# Patient Record
Sex: MALE | Race: ASIAN | NOT HISPANIC OR LATINO
[De-identification: names, ages, dates, MRNs, and addresses within clinical notes are randomized per-mention and may not be internally consistent; named-entity substitution may affect disease eponyms.]

---

## 2021-05-24 PROBLEM — Z00.00 ENCOUNTER FOR PREVENTIVE HEALTH EXAMINATION: Status: ACTIVE | Noted: 2021-05-24

## 2021-07-12 ENCOUNTER — APPOINTMENT (OUTPATIENT)
Dept: DISASTER EMERGENCY | Facility: CLINIC | Age: 71
End: 2021-07-12

## 2021-07-12 DIAGNOSIS — Z01.818 ENCOUNTER FOR OTHER PREPROCEDURAL EXAMINATION: ICD-10-CM

## 2021-07-13 LAB — SARS-COV-2 N GENE NPH QL NAA+PROBE: NOT DETECTED

## 2021-07-14 ENCOUNTER — APPOINTMENT (OUTPATIENT)
Dept: PULMONOLOGY | Facility: CLINIC | Age: 71
End: 2021-07-14
Payer: MEDICARE

## 2021-07-14 VITALS
OXYGEN SATURATION: 99 % | TEMPERATURE: 97.2 F | HEART RATE: 73 BPM | DIASTOLIC BLOOD PRESSURE: 75 MMHG | SYSTOLIC BLOOD PRESSURE: 114 MMHG

## 2021-07-14 VITALS — HEART RATE: 81 BPM | BODY MASS INDEX: 19.18 KG/M2 | HEIGHT: 68.5 IN | WEIGHT: 128 LBS | TEMPERATURE: 97.7 F

## 2021-07-14 DIAGNOSIS — Z23 ENCOUNTER FOR IMMUNIZATION: ICD-10-CM

## 2021-07-14 DIAGNOSIS — J47.9 BRONCHIECTASIS, UNCOMPLICATED: ICD-10-CM

## 2021-07-14 DIAGNOSIS — R09.3 ABNORMAL SPUTUM: ICD-10-CM

## 2021-07-14 DIAGNOSIS — Z82.49 FAMILY HISTORY OF ISCHEMIC HEART DISEASE AND OTHER DISEASES OF THE CIRCULATORY SYSTEM: ICD-10-CM

## 2021-07-14 DIAGNOSIS — R04.2 HEMOPTYSIS: ICD-10-CM

## 2021-07-14 PROCEDURE — 99072 ADDL SUPL MATRL&STAF TM PHE: CPT

## 2021-07-14 PROCEDURE — 99204 OFFICE O/P NEW MOD 45 MIN: CPT | Mod: 25

## 2021-07-14 PROCEDURE — 94729 DIFFUSING CAPACITY: CPT

## 2021-07-14 PROCEDURE — ZZZZZ: CPT

## 2021-07-14 PROCEDURE — 94010 BREATHING CAPACITY TEST: CPT

## 2021-07-14 PROCEDURE — 94726 PLETHYSMOGRAPHY LUNG VOLUMES: CPT

## 2021-07-14 NOTE — CONSULT LETTER
[Dear  ___] : Dear  [unfilled], [Consult Letter:] : I had the pleasure of evaluating your patient, [unfilled]. [Please see my note below.] : Please see my note below. [Consult Closing:] : Thank you very much for allowing me to participate in the care of this patient.  If you have any questions, please do not hesitate to contact me. [Sincerely,] : Sincerely, [FreeTextEntry3] : Marga Fletcher MD\par Pulmonary, Critical Care, and Sleep Medicine\par  of Medicine\par [employer] at [facility]

## 2021-07-14 NOTE — PHYSICAL EXAM
[No Acute Distress] : no acute distress [Normal Oropharynx] : normal oropharynx [I] : Mallampati Class: I [Normal Appearance] : normal appearance [No Neck Mass] : no neck mass [Normal Rate/Rhythm] : normal rate/rhythm [Normal S1, S2] : normal s1, s2 [No Murmurs] : no murmurs [No Resp Distress] : no resp distress [Clear to Auscultation Bilaterally] : clear to auscultation bilaterally [No Abnormalities] : no abnormalities [Benign] : benign [Normal Gait] : normal gait [No Clubbing] : no clubbing [No Cyanosis] : no cyanosis [No Edema] : no edema [FROM] : FROM [Normal Color/ Pigmentation] : normal color/ pigmentation [No Focal Deficits] : no focal deficits [Oriented x3] : oriented x3 [Normal Affect] : normal affect

## 2021-07-14 NOTE — HISTORY OF PRESENT ILLNESS
[Never] : never [TextBox_4] : This is a 71 year old male referred by Dr. Cotton for evaluation of hemoptysis\par \par Patient reports not other medical conditions and does not take any medications.\par \par He is a never smoker. He reports that occasionally, on average, once every other month he has episodes of hemoptysis, about a teaspoon of blood admixed with sputum that lasts for about 3 days each time. These events are associated with fatigue but denies fever, runny nose, URI symptoms. He does have mucous production daily and expectorates whitish-yellowish sputum on a regular basis. He denies weight loss, night sweats, history of tuberculosis. Had history of BCG vaccination. 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 Denies shortness of breath - able to run 8 km without issues.\par

## 2021-07-14 NOTE — REASON FOR VISIT
[Consultation] : a consultation [Spouse] : spouse [TextBox_44] : Hemoptysis [TextBox_13] : Dr. Medhat Cotton

## 2021-07-14 NOTE — ASSESSMENT
[FreeTextEntry1] : 71 year old male referred for evaluation of hemoptysis. \par PFT unremarkable. \par Hemoptysis possibly related to bronchiectasis.\par Will check CT chest, sputum culture.\par UTD with flu, pneumococcal and COVID vaccination.\par \par Follow up after above is available.

## 2021-10-01 ENCOUNTER — APPOINTMENT (OUTPATIENT)
Dept: INTERNAL MEDICINE | Facility: CLINIC | Age: 71
End: 2021-10-01

## 2021-10-08 ENCOUNTER — APPOINTMENT (OUTPATIENT)
Dept: INTERNAL MEDICINE | Facility: CLINIC | Age: 71
End: 2021-10-08